# Patient Record
Sex: FEMALE | Race: ASIAN | NOT HISPANIC OR LATINO | Employment: FULL TIME | URBAN - METROPOLITAN AREA
[De-identification: names, ages, dates, MRNs, and addresses within clinical notes are randomized per-mention and may not be internally consistent; named-entity substitution may affect disease eponyms.]

---

## 2021-06-02 ENCOUNTER — RECORDS - HEALTHEAST (OUTPATIENT)
Dept: ADMINISTRATIVE | Facility: CLINIC | Age: 41
End: 2021-06-02

## 2021-06-19 ENCOUNTER — HOSPITAL ENCOUNTER (EMERGENCY)
Dept: EMERGENCY MEDICINE | Facility: HOSPITAL | Age: 41
Discharge: HOME | End: 2021-06-19
Attending: EMERGENCY MEDICINE

## 2021-06-19 DIAGNOSIS — O20.0 THREATENED MISCARRIAGE: ICD-10-CM

## 2021-06-19 DIAGNOSIS — R10.2 PELVIC PAIN IN FEMALE: ICD-10-CM

## 2021-06-19 LAB
ABO/RH(D): NORMAL
ALBUMIN SERPL-MCNC: 4.2 G/DL (ref 3.5–5)
ALBUMIN UR-MCNC: ABNORMAL G/DL
ALP SERPL-CCNC: 71 U/L (ref 45–120)
ALT SERPL W P-5'-P-CCNC: 28 U/L (ref 0–45)
ANION GAP SERPL CALCULATED.3IONS-SCNC: 12 MMOL/L (ref 5–18)
ANTIBODY SCREEN: NEGATIVE
APPEARANCE UR: CLEAR
APTT PPP: 25 SECONDS (ref 24–37)
AST SERPL W P-5'-P-CCNC: 23 U/L (ref 0–40)
BACTERIA #/AREA URNS HPF: ABNORMAL /[HPF]
BASOPHILS # BLD AUTO: 0 THOU/UL (ref 0–0.2)
BASOPHILS NFR BLD AUTO: 0 % (ref 0–2)
BILIRUB DIRECT SERPL-MCNC: 0.3 MG/DL
BILIRUB SERPL-MCNC: 1 MG/DL (ref 0–1)
BILIRUB UR QL STRIP: NEGATIVE
BUN SERPL-MCNC: 4 MG/DL (ref 8–22)
CALCIUM SERPL-MCNC: 8.9 MG/DL (ref 8.5–10.5)
CHLORIDE BLD-SCNC: 105 MMOL/L (ref 98–107)
CO2 SERPL-SCNC: 17 MMOL/L (ref 22–31)
COLOR UR AUTO: ABNORMAL
CREAT SERPL-MCNC: 0.65 MG/DL (ref 0.6–1.1)
EOSINOPHIL # BLD AUTO: 0 THOU/UL (ref 0–0.4)
EOSINOPHIL NFR BLD AUTO: 0 % (ref 0–6)
ERYTHROCYTE [DISTWIDTH] IN BLOOD BY AUTOMATED COUNT: 13.2 % (ref 11–14.5)
GFR SERPL CREATININE-BSD FRML MDRD: >60 ML/MIN/1.73M2
GLUCOSE BLD-MCNC: 133 MG/DL (ref 70–125)
GLUCOSE UR STRIP-MCNC: NEGATIVE MG/DL
HCG SERPL QL: POSITIVE
HCG SERPL-ACNC: 101 MLU/ML (ref 0–4)
HCT VFR BLD AUTO: 41.1 % (ref 35–47)
HGB BLD-MCNC: 13.8 G/DL (ref 12–16)
HGB UR QL STRIP: ABNORMAL
IMM GRANULOCYTES # BLD: 0.1 THOU/UL
IMM GRANULOCYTES NFR BLD: 1 %
INR PPP: 1.02 (ref 0.9–1.1)
KETONES UR STRIP-MCNC: ABNORMAL MG/DL
LACTATE SERPL-SCNC: 2.6 MMOL/L (ref 0.7–2)
LEUKOCYTE ESTERASE UR QL STRIP: NEGATIVE
LIPASE SERPL-CCNC: 10 U/L (ref 0–52)
LYMPHOCYTES # BLD AUTO: 1.1 THOU/UL (ref 0.8–4.4)
LYMPHOCYTES NFR BLD AUTO: 10 % (ref 20–40)
MAGNESIUM SERPL-MCNC: 1.6 MG/DL (ref 1.8–2.6)
MCH RBC QN AUTO: 29.2 PG (ref 27–34)
MCHC RBC AUTO-ENTMCNC: 33.6 G/DL (ref 32–36)
MCV RBC AUTO: 87 FL (ref 80–100)
MONOCYTES # BLD AUTO: 0.5 THOU/UL (ref 0–0.9)
MONOCYTES NFR BLD AUTO: 4 % (ref 2–10)
MUCOUS THREADS #/AREA URNS LPF: PRESENT LPF
NEUTROPHILS # BLD AUTO: 9.2 THOU/UL (ref 2–7.7)
NEUTROPHILS NFR BLD AUTO: 85 % (ref 50–70)
NITRATE UR QL: NEGATIVE
PH UR STRIP: 8.5 [PH] (ref 5–8)
PLATELET # BLD AUTO: 298 THOU/UL (ref 140–440)
PMV BLD AUTO: 8.9 FL (ref 8.5–12.5)
POC PREG URINE (HCG) HE - HISTORICAL: NEGATIVE
POCT KIT EXPIRATION DATE HE - HISTORICAL: NORMAL
POCT KIT LOT NUMBER HE - HISTORICAL: NORMAL
POCT NEGATIVE CONTROL HE - HISTORICAL: NORMAL
POCT POSITIVE CONTROL HE - HISTORICAL: NORMAL
POTASSIUM BLD-SCNC: 3.5 MMOL/L (ref 3.5–5)
PROT SERPL-MCNC: 7.8 G/DL (ref 6–8)
RBC # BLD AUTO: 4.73 MILL/UL (ref 3.8–5.4)
RBC URINE: 12 HPF
SODIUM SERPL-SCNC: 134 MMOL/L (ref 136–145)
SP GR UR STRIP: 1.02 (ref 1–1.03)
SQUAMOUS EPITHELIAL: <1 /HPF
UROBILINOGEN UR STRIP-ACNC: ABNORMAL
WBC URINE: 1 HPF
WBC: 10.9 THOU/UL (ref 4–11)

## 2021-06-19 ASSESSMENT — MIFFLIN-ST. JEOR: SCORE: 1324.53

## 2021-06-21 ENCOUNTER — COMMUNICATION - HEALTHEAST (OUTPATIENT)
Dept: SCHEDULING | Facility: CLINIC | Age: 41
End: 2021-06-21

## 2021-06-25 NOTE — ED TRIAGE NOTES
Lower abd pain since this morning. hyperventillating in triage. No nausea, diarrhea or constipation. Pt normally gets period every month but hasn't had it for 3 months. Took negative pregnancy test a while ago. States some spotting today. Pt c/o cramping lower abd pain

## 2021-06-26 NOTE — TELEPHONE ENCOUNTER
New Appointment Needed  What is the reason for the visit:    Inpatient/ED Follow Up Appt Request  At what hospital or facility were you seen?: [facility]   What is the reason you were seen?: possible miscarriage   What date were you admitted?: 5/19/2021  What date were you discharged?: date: n/a  What was the recommended timeframe for your follow up appointment?: asap   Provider Preference: prefers female provider   How soon do you need to be seen?: asap  Waitlist offered?: No  Okay to leave a detailed message:  Yes

## 2021-06-26 NOTE — ED PROVIDER NOTES
EMERGENCY DEPARTMENT ENCOUNTER      NAME: Donita Osullivan  AGE: 41 y.o. female  YOB: 1980  MRN: [id number]  EVALUATION DATE & TIME: 6/19/2021  3:06 PM    PCP: Provider, No Primary Care    ED PROVIDER: Elizabeth Matthew M.D.      Chief Complaint   Patient presents with     Abdominal Pain         FINAL IMPRESSION:  1. Threatened miscarriage    2. Pelvic pain in female          ED COURSE & MEDICAL DECISION MAKING:    Pertinent Labs & Imaging studies reviewed. (See chart for details)  3:09 PM I met with the patient to gather history and to perform my initial exam. We discussed plans for the ED course, including diagnostic testing and treatment. PPE worn: n95 mask.  3:16 PM Bedside ultrasound completed.  ED Course as of Jun 19 1914   Sat Jun 19, 2021   1522 Patient is a 41-year-old female comes in today with lower abdominal pain.  She is reports that started gradually but then got a lot worse throughout the day and she looks quite uncomfortable when I was in there.  She has tenderness to the lower abdomen.  She does not believe she is pregnant.  She had she had negative pregnancy test recently.  I did a point-of-care ultrasound and look for any signs of free fluid and did not see any.  Suprapubic area was little difficult since she did not have a full bladder so orientation was difficult.  I did not see any free fluid.  It is possible there is something in the uterus but was hard to tell for sure.  I ordered some blood work and will plan to get CT imaging on her.  Order some morphine and some IV fluids.  On arrival she was tachypneic but heart rate and blood pressure both looked okay.  She will need to be hooked up to the monitor.  I discussed all this with her and she is in agreement with the plan.    [CB]   9375 Patient's urine pregnancy came back negative and then her blood pregnancy came back positive.  I called down to CT because I was in a hold off but CT had already been obtained.  It looks like the blood  pregnancy test came back 4 minutes after the CT had already been started.  The urine pregnancy test came back 40 minutes ago and came back negative.  We will see what CT imaging looks like to help determine what is going on with her.  She had quite a lot of discomfort so hopefully we can figure out what is going on.  I have ordered an ultrasound of the baby to further evaluate.    [CB]   1700 Beta quant came back at 101.  The CT scan did not show another cause of her pain.  We will try to get her down for ultrasound imaging but at this point I am suspecting that she may be miscarrying.    [CB]   1844 It is very possible the patient is actively miscarrying.  She'll need to follow-up closely.  I'll check in on her and see how she is feeling.    [CB]   1849 I went back and talked to the patient.  I told her what we knew and we didn't know.  I told her it was really important to have her follow-up as an outpatient and she agreed to do so.  She agrees to do so.  She has a family practice doctor that she follows with as her primary and I think they could help manage this.  I told her to take Tylenol as needed for pain and she agrees to do that.  We will get her discharged home.    [CB]      ED Course User Index  [CB] Elizabeth Matthew MD       At the conclusion of the encounter I discussed  the results of all of the tests and the disposition with patient.   All questions were answered.  The patient acknowledged understanding and was involved in the decision making regarding the overall care plan.      I discussed with patient the utility, limitations and findings of the exam/interventions/studies done during this visit as well as the list of differential diagnosis and symptoms to monitor/return to ER for.  Additional verbal discharge instructions were provided.     MEDICATIONS GIVEN IN THE EMERGENCY:  Medications   sodium chloride flush 10 mL (NS) (10 mL Intravenous Given 6/19/21 1711)   sodium chloride 0.9% 1,000  mL (0 mL Intravenous Stopped 6/19/21 1700)   morphine injection 4 mg (4 mg Intravenous Given 6/19/21 1535)   iopamidol solution 100 mL (ISOVUE-370) (100 mL Intravenous Given 6/19/21 1636)   acetaminophen tablet 1,000 mg (TYLENOL) (1,000 mg Oral Given 6/19/21 1709)       NEW PRESCRIPTIONS STARTED AT TODAY'S ER VISIT  There are no discharge medications for this patient.         =================================================================    HPI    Patient information was obtained from: patient     Use of : Yes (In Person, family) - Language: Albertina Osullivan is a 41 y.o. female who presents for evaluation of lower abdominal pain.     Patient reports gradual onset cramping lower abdominal pain which began this morning and acutely worsened this afternoon. Her pain is constant and radiates into her back. Also reports recent small amounts of vaginal bleeding but had increased bleeding this morning. Denies nausea or vomiting. No history of STI's. Patient is otherwise healthy. No other complaints or concerns expressed at this time.      REVIEW OF SYSTEMS   Except as stated in the HPI all other systems reviewed and are negative.    PAST MEDICAL HISTORY:  No past medical history on file.    PAST SURGICAL HISTORY:  No past surgical history on file.    CURRENT MEDICATIONS:      Current Facility-Administered Medications:      [COMPLETED] Insert peripheral IV, , , Once **AND** [COMPLETED] Saline lock IV, , , Once **AND** sodium chloride flush 10 mL (NS), 10 mL, Intravenous, [facility], Elizabeth Matthew MD, 10 mL at 06/19/21 1711  No current outpatient medications on file.    ALLERGIES:  No Known Allergies    FAMILY HISTORY:  No family history on file.    SOCIAL HISTORY:   Social History     Socioeconomic History     Marital status: Single     Spouse name: Not on file     Number of children: Not on file     Years of education: Not on file     Highest education level: Not on file   Occupational History  "    Not on file   Social Needs     Financial resource strain: Not on file     Food insecurity     Worry: Not on file     Inability: Not on file     Transportation needs     Medical: Not on file     Non-medical: Not on file   Tobacco Use     Smoking status: Not on file   Substance and Sexual Activity     Alcohol use: Not on file     Drug use: Not on file     Sexual activity: Not on file   Lifestyle     Physical activity     Days per week: Not on file     Minutes per session: Not on file     Stress: Not on file   Relationships     Social connections     Talks on phone: Not on file     Gets together: Not on file     Attends Zoroastrian service: Not on file     Active member of club or organization: Not on file     Attends meetings of clubs or organizations: Not on file     Relationship status: Not on file     Intimate partner violence     Fear of current or ex partner: Not on file     Emotionally abused: Not on file     Physically abused: Not on file     Forced sexual activity: Not on file   Other Topics Concern     Not on file   Social History Narrative     Not on file       PHYSICAL EXAM    VITAL SIGNS: BP 95/58   Pulse 69   Temp 98  F (36.7  C) (Oral)   Resp (!) 32   Ht 5' 3\" (1.6 m)   Wt 150 lb (68 kg)   LMP 03/19/2021   SpO2 97%   BMI 26.57 kg/m     GENERAL: Awake, Alert, answering questions, Very uncomfortable, Well nourished  HEENT: Normal cephalic, Atraumatic, bilateral external ears normal, No scleral icterus, mask in place  NECK: No obvious swelling or abnormality, No stridor  PULMONARY:Normal and symmetric breath sounds, No respiratory distress, Lungs clear to auscultation bilaterally. No wheezing  CARDIOVASCULAR: Regular rate and rhythm, Distal pulses present and normal.  ABDOMINAL: Soft, Nondistended, lower abdominal tenderness, No flank tenderness, No palpable masses  BACK: No bruising or tenderness.  EXTREMITIES: Moves all extremities spontaneously, warm, no edema, No major deformities  NEURO: No " facial droop, normal motor function, Normal speech   PSYCH: Normal mood and affect  SKIN: No rashes on visualized skin, dry, warm     LAB:  All pertinent labs reviewed and interpreted.  Results for orders placed or performed during the hospital encounter of 06/19/21   Type and Screen   Result Value Ref Range    ABORh O POS     Antibody Screen Negative Negative   INR   Result Value Ref Range    INR 1.02 0.90 - 1.10   APTT(PTT)   Result Value Ref Range    PTT 25 24 - 37 seconds   Magnesium   Result Value Ref Range    Magnesium 1.6 (L) 1.8 - 2.6 mg/dL   Lipase   Result Value Ref Range    Lipase 10 0 - 52 U/L   Hepatic Profile   Result Value Ref Range    Bilirubin, Total 1.0 0.0 - 1.0 mg/dL    Bilirubin, Direct 0.3 <=0.5 mg/dL    Protein, Total 7.8 6.0 - 8.0 g/dL    Albumin 4.2 3.5 - 5.0 g/dL    Alkaline Phosphatase 71 45 - 120 U/L    AST 23 0 - 40 U/L    ALT 28 0 - 45 U/L   Lactic Acid   Result Value Ref Range    Lactic Acid 2.6 (H) 0.7 - 2.0 mmol/L   Basic Metabolic Panel   Result Value Ref Range    Sodium 134 (L) 136 - 145 mmol/L    Potassium 3.5 3.5 - 5.0 mmol/L    Chloride 105 98 - 107 mmol/L    CO2 17 (L) 22 - 31 mmol/L    Anion Gap, Calculation 12 5 - 18 mmol/L    Glucose 133 (H) 70 - 125 mg/dL    Calcium 8.9 8.5 - 10.5 mg/dL    BUN 4 (L) 8 - 22 mg/dL    Creatinine 0.65 0.60 - 1.10 mg/dL    GFR MDRD Af Amer >60 >60 mL/min/1.73m2    GFR MDRD Non Af Amer >60 >60 mL/min/1.73m2   Urinalysis-UC if Indicated   Result Value Ref Range    Color, UA Light Yellow Light Yellow, Yellow    Clarity, UA Clear Clear    Glucose, UA Negative Negative    Protein, UA 30 mg/dL (!) Negative    Bilirubin, UA Negative Negative    Urobilinogen, UA <2.0 mg/dL <2.0 mg/dL    pH, UA 8.5 (H) 5.0 - 8.0    Blood, UA 0.1 mg/dL (!) Negative    Ketones, UA 60 mg/dL (!) Negative    Nitrite, UA Negative Negative    Leukocytes, UA Negative Negative    Specific Gravity, UA 1.016 1.001 - 1.030    RBC, UA 12 (H) <=2 hpf    WBC UA 1 <=5 hpf    Bacteria,  UA None Seen None Seen    Squamous Epithel, UA <1 <=5 /HPF    Mucus, UA Present (!) None Seen lpf   HCG, Qual   Result Value Ref Range    Beta hCG Qualitative Positive (!) Negative   HM1 (CBC with Diff)   Result Value Ref Range    WBC 10.9 4.0 - 11.0 thou/uL    RBC 4.73 3.80 - 5.40 mill/uL    Hemoglobin 13.8 12.0 - 16.0 g/dL    Hematocrit 41.1 35.0 - 47.0 %    MCV 87 80 - 100 fL    MCH 29.2 27.0 - 34.0 pg    MCHC 33.6 32.0 - 36.0 g/dL    RDW 13.2 11.0 - 14.5 %    Platelets 298 140 - 440 thou/uL    MPV 8.9 8.5 - 12.5 fL    Neutrophils % 85 (H) 50 - 70 %    Lymphocytes % 10 (L) 20 - 40 %    Monocytes % 4 2 - 10 %    Eosinophils % 0 0 - 6 %    Basophils % 0 0 - 2 %    Immature Granulocyte % 1 (H) <=0 %    Neutrophils Absolute 9.2 (H) 2.0 - 7.7 thou/uL    Lymphocytes Absolute 1.1 0.8 - 4.4 thou/uL    Monocytes Absolute 0.5 0.0 - 0.9 thou/uL    Eosinophils Absolute 0.0 0.0 - 0.4 thou/uL    Basophils Absolute 0.0 0.0 - 0.2 thou/uL    Immature Granulocyte Absolute 0.1 (H) <=0.0 thou/uL   HCG, Quant   Result Value Ref Range    Beta-hCG, Quantitative 101 (H) 0 - 4 mlU/mL   POCT pregnancy, urine   Result Value Ref Range    POC Preg, Urine Negative Negative    POCT Kit Lot Number [id number]     POCT Kit Expiration Date 2022-07-31     Pos Control Valid Control Valid Control    Neg Control Valid Control Valid Control       RADIOLOGY:      Us Ob < 14 Weeks With Transvaginal    Result Date: 6/19/2021  EXAM: US OB < 14 WEEKS WITH TRANSVAGINAL LOCATION: [facility] DATE/TIME: 6/19/2021 5:42 PM INDICATION: Pelvic pain. Beta hCG level of 101 COMPARISON: CT abdomen and pelvis 06/19/2021. TECHNIQUE: Transabdominal scans were performed. Endovaginal ultrasound was performed to better visualize the embryo. FINDINGS: The uterus measures 11.1 x 5.3 x 5.9 cm. Endometrial complex measures 2.9 cm in thickness and contains a large amount of fluid. Possible tiny yolk sac within the lower endometrial canal.  RIGHT OVARY: Normal.  LEFT OVARY: Not definitely visualized due to bowel gas.     Large amount of fluid within the endometrial canal. Possible yolk sac in the lower endometrial canal, but no fetal pole identified. Findings may represent a  in progress. Follow up with beta hCG levels and ultrasound recommended.    Ct Abdomen Pelvis Without Oral With Iv Contrast    Result Date: 2021  EXAM: CT ABDOMEN PELVIS WO ORAL W IV CONTRAST LOCATION: [facility] DATE/TIME: 2021 4:29 PM INDICATION: abdominal pain COMPARISON: None. TECHNIQUE: CT scan of the abdomen and pelvis was performed following injection of IV contrast. Multiplanar reformats were obtained. Dose reduction techniques were used. CONTRAST: Iopamidol (Isovue-370) 100mL FINDINGS: LOWER CHEST: Normal. HEPATOBILIARY: Mild hepatic steatosis. No suspicious liver lesion. Gallbladder unremarkable. PANCREAS: Normal. SPLEEN: Normal. ADRENAL GLANDS: Normal. KIDNEYS/BLADDER: Normal. BOWEL: Normal appendix. No free air, free fluid, or inflammatory change. Large amount of stool throughout the colon. LYMPH NODES: Normal. VASCULATURE: Unremarkable. PELVIC ORGANS: Prominent fluid-filled uterus. No adnexal mass. MUSCULOSKELETAL: Normal.     1.  No acute abnormality in the abdomen or pelvis. 2.  Mild hepatic steatosis. 3.  Prominent fluid-filled uterus correlate with menstrual history. Follow-up pelvic ultrasound may be indicated depending on the clinical picture.    PROCEDURES:   PROCEDURE: Emergency Department Limited Bedside Screening Ultrasound   ANATOMICAL WINDOW: Lower abdomen   INDICATIONS: Abdominal pain and vaginal bleeding   PROCEDURE PROVIDER: Dr. Matthew   FINDINGS: Pelvic Ultrasound: indications: abdominal pain and pelvic pain. Type of US performed: transabdominal exam. ED Findings:  No free fluid   IMAGES PRINTED & SCANNED OR SAVED TO MEMORY: yes         Jadon MACKENZIE, am serving as a scribe to document services personally performed by Dr. Matthew  based on my observation and the provider's statements to me. I, Elizabeth Matthew MD attest that Jadon Fleming is acting in a scribe capacity, has observed my performance of the services and has documented them in accordance with my direction.    Elizabeth Matthew M.D.  Emergency Medicine  [facility] EMERGENCY DEPARTMENT  [address]  [facility] [address]  Dept: [phone]  Loc: [phone]     Elizabeth Matthew MD  06/19/21 1915

## 2021-07-06 VITALS — WEIGHT: 150 LBS | HEIGHT: 63 IN | BODY MASS INDEX: 26.58 KG/M2

## 2021-08-27 ENCOUNTER — OFFICE VISIT (OUTPATIENT)
Dept: FAMILY MEDICINE | Facility: CLINIC | Age: 41
End: 2021-08-27
Payer: COMMERCIAL

## 2021-08-27 VITALS
HEIGHT: 64 IN | DIASTOLIC BLOOD PRESSURE: 72 MMHG | BODY MASS INDEX: 27.31 KG/M2 | HEART RATE: 70 BPM | SYSTOLIC BLOOD PRESSURE: 114 MMHG | TEMPERATURE: 98.5 F | WEIGHT: 160 LBS | OXYGEN SATURATION: 99 %

## 2021-08-27 DIAGNOSIS — Z00.00 ROUTINE GENERAL MEDICAL EXAMINATION AT A HEALTH CARE FACILITY: Primary | ICD-10-CM

## 2021-08-27 DIAGNOSIS — Z11.3 SCREEN FOR STD (SEXUALLY TRANSMITTED DISEASE): ICD-10-CM

## 2021-08-27 DIAGNOSIS — Z12.4 PAP SMEAR FOR CERVICAL CANCER SCREENING: ICD-10-CM

## 2021-08-27 PROCEDURE — G0123 SCREEN CERV/VAG THIN LAYER: HCPCS | Performed by: FAMILY MEDICINE

## 2021-08-27 PROCEDURE — 87624 HPV HI-RISK TYP POOLED RSLT: CPT | Performed by: FAMILY MEDICINE

## 2021-08-27 PROCEDURE — 87591 N.GONORRHOEAE DNA AMP PROB: CPT | Performed by: FAMILY MEDICINE

## 2021-08-27 PROCEDURE — 99395 PREV VISIT EST AGE 18-39: CPT | Performed by: FAMILY MEDICINE

## 2021-08-27 PROCEDURE — 87491 CHLMYD TRACH DNA AMP PROBE: CPT | Performed by: FAMILY MEDICINE

## 2021-08-27 ASSESSMENT — MIFFLIN-ST. JEOR: SCORE: 1377.82

## 2021-08-27 NOTE — PROGRESS NOTES
SUBJECTIVE:   CC: Donita Osullivan is an 41 year old woman who presents for preventive health visit.     Patient has been advised of split billing requirements and indicates understanding: Yes     Healthy Habits:     Getting at least 3 servings of Calcium per day:  Yes    Bi-annual eye exam:  Yes    Dental care twice a year:  NO    Sleep apnea or symptoms of sleep apnea:  None    Diet:  Regular (no restrictions)    Frequency of exercise:  None    Taking medications regularly:  Yes    Medication side effects:  None    PHQ-2 Total Score: 0    Ability to successfully perform activities of daily living: Yes, no assistance needed      Today's PHQ-2 Score:   PHQ-2 ( 1999 Pfizer) 8/27/2021   Q1: Little interest or pleasure in doing things 0   Q2: Feeling down, depressed or hopeless 0   PHQ-2 Score 0   Q1: Little interest or pleasure in doing things Not at all   Q2: Feeling down, depressed or hopeless Not at all   PHQ-2 Score 0       Abuse: Current or Past (Physical, Sexual or Emotional) - No  Do you feel safe in your environment? Yes      Social History     Tobacco Use     Smoking status: Never Smoker     Smokeless tobacco: Never Used   Substance Use Topics     Alcohol use: Never         Alcohol Use 8/27/2021   Prescreen: >3 drinks/day or >7 drinks/week? Not Applicable       Reviewed orders with patient.  Reviewed health maintenance and updated orders accordingly - Yes  Labs reviewed in EPIC    Breast Cancer Screening:  Any new diagnosis of family breast, ovarian, or bowel cancer? No    Patient under 40 years of age: Routine Mammogram Screening not recommended.   Pertinent mammograms are reviewed under the imaging tab.    History of abnormal Pap smear: NO - age 30-65 PAP every 5 years with negative HPV co-testing recommended     Reviewed and updated as needed this visit by clinical staff  Tobacco  Allergies  Meds              Reviewed and updated as needed this visit by Provider  Tobacco  Allergies  Meds  Problems  Med  "Hx  Surg Hx  Fam Hx                OBJECTIVE:   /72 (BP Location: Right arm, Patient Position: Sitting, Cuff Size: Adult Regular)   Pulse 70   Temp 98.5  F (36.9  C) (Oral)   Ht 1.613 m (5' 3.5\")   Wt 72.6 kg (160 lb)   LMP 08/06/2021 (Approximate)   SpO2 99%   BMI 27.90 kg/m    Physical Exam  General: Alert and oriented, in NAD.  Eyes: PERRL, EOMI, sclera normal.  HENT: Normocephalic, no pharyngeal erythema, MMM.  Neck: Supple, no adenopathy.  Heart: Normal S1 and S2, regular rhythm. No murmurs, gallops, rubs.  Lungs: CTA bilaterally, no wheezes, no crackles, no rhonchi.  Abdomen: Soft, non-tender, non-distended, BS+.   MSK: Normal ROM of upper and lower extremities.  Extremities: No peripheral edema.  Psych: Normal mood and affect.      ASSESSMENT/PLAN:   Donita was seen today for physical.    Diagnoses and all orders for this visit:    Routine general medical examination at a health care facility    Pap smear for cervical cancer screening: Pap with cotesting. If normal, repeat in 5 years.  -     Pap screen with HPV - recommended age 30 - 65 years    Screen for STD (sexually transmitted disease): Asymptomatic screening.  -     Neisseria gonorrhoeae PCR - Clinic Collect  -     Chlamydia trachomatis PCR - Clinic Collect    Preconception counseling: Hx of recent SAB. She does desire pregnancy- reviewed importance of taking prenatal vitamins and healthy eating/exercise. Due to her age, if she is not yet pregnant in 3 months, will refer to OB-GYN. Reviewed timing intercourse      Patient has been advised of split billing requirements and indicates understanding: Yes  COUNSELING:  Reviewed preventive health counseling, as reflected in patient instructions       Regular exercise       Healthy diet/nutrition    Estimated body mass index is 27.9 kg/m  as calculated from the following:    Height as of this encounter: 1.613 m (5' 3.5\").    Weight as of this encounter: 72.6 kg (160 lb).    Weight management " plan: Discussed healthy diet and exercise guidelines    She reports that she has never smoked. She has never used smokeless tobacco.      Counseling Resources:  ATP IV Guidelines  Pooled Cohorts Equation Calculator  Breast Cancer Risk Calculator  BRCA-Related Cancer Risk Assessment: FHS-7 Tool  FRAX Risk Assessment  ICSI Preventive Guidelines  Dietary Guidelines for Americans, 2010  USDA's MyPlate  ASA Prophylaxis  Lung CA Screening    Muna Moralez MD  [facility]

## 2021-08-29 LAB
C TRACH DNA SPEC QL NAA+PROBE: NEGATIVE
N GONORRHOEA DNA SPEC QL NAA+PROBE: NEGATIVE

## 2021-08-31 LAB
HUMAN PAPILLOMA VIRUS 16 DNA: NEGATIVE
HUMAN PAPILLOMA VIRUS 18 DNA: NEGATIVE
HUMAN PAPILLOMA VIRUS FINAL DIAGNOSIS: NORMAL
HUMAN PAPILLOMA VIRUS OTHER HR: NEGATIVE

## 2021-09-03 LAB
BKR LAB AP GYN ADEQUACY: NORMAL
BKR LAB AP GYN INTERPRETATION: NORMAL
BKR LAB AP HPV REFLEX: NORMAL
BKR LAB AP PREVIOUS ABNORMAL: NORMAL
PATH REPORT.COMMENTS IMP SPEC: NORMAL
PATH REPORT.RELEVANT HX SPEC: NORMAL

## 2021-12-03 ENCOUNTER — OFFICE VISIT (OUTPATIENT)
Dept: FAMILY MEDICINE | Facility: CLINIC | Age: 41
End: 2021-12-03
Payer: COMMERCIAL

## 2021-12-03 VITALS
OXYGEN SATURATION: 99 % | SYSTOLIC BLOOD PRESSURE: 114 MMHG | WEIGHT: 160 LBS | HEART RATE: 98 BPM | DIASTOLIC BLOOD PRESSURE: 66 MMHG | BODY MASS INDEX: 27.31 KG/M2 | TEMPERATURE: 97.9 F | RESPIRATION RATE: 16 BRPM | HEIGHT: 64 IN

## 2021-12-03 DIAGNOSIS — Z23 IMMUNIZATION DUE: ICD-10-CM

## 2021-12-03 DIAGNOSIS — N97.9 FEMALE INFERTILITY: Primary | ICD-10-CM

## 2021-12-03 DIAGNOSIS — Z23 HIGH PRIORITY FOR 2019-NCOV VACCINE: ICD-10-CM

## 2021-12-03 LAB
ALBUMIN SERPL-MCNC: 3.8 G/DL (ref 3.5–5)
ALP SERPL-CCNC: 68 U/L (ref 45–120)
ALT SERPL W P-5'-P-CCNC: 29 U/L (ref 0–45)
ANION GAP SERPL CALCULATED.3IONS-SCNC: 10 MMOL/L (ref 5–18)
AST SERPL W P-5'-P-CCNC: 17 U/L (ref 0–40)
BILIRUB SERPL-MCNC: 0.7 MG/DL (ref 0–1)
BUN SERPL-MCNC: 7 MG/DL (ref 8–22)
CALCIUM SERPL-MCNC: 9.4 MG/DL (ref 8.5–10.5)
CHLORIDE BLD-SCNC: 106 MMOL/L (ref 98–107)
CO2 SERPL-SCNC: 23 MMOL/L (ref 22–31)
CREAT SERPL-MCNC: 0.61 MG/DL (ref 0.6–1.1)
ERYTHROCYTE [DISTWIDTH] IN BLOOD BY AUTOMATED COUNT: 12.9 % (ref 10–15)
FSH SERPL-ACNC: 5.7 MIU/ML
GFR SERPL CREATININE-BSD FRML MDRD: >90 ML/MIN/1.73M2
GLUCOSE BLD-MCNC: 107 MG/DL (ref 70–125)
HBA1C MFR BLD: 6.2 % (ref 0–5.6)
HCT VFR BLD AUTO: 37.3 % (ref 35–47)
HGB BLD-MCNC: 12.1 G/DL (ref 11.7–15.7)
MCH RBC QN AUTO: 28.6 PG (ref 26.5–33)
MCHC RBC AUTO-ENTMCNC: 32.4 G/DL (ref 31.5–36.5)
MCV RBC AUTO: 88 FL (ref 78–100)
PLATELET # BLD AUTO: 306 10E3/UL (ref 150–450)
POTASSIUM BLD-SCNC: 3.7 MMOL/L (ref 3.5–5)
PROLACTIN SERPL-MCNC: 12.6 NG/ML (ref 0–20)
PROT SERPL-MCNC: 7.6 G/DL (ref 6–8)
RBC # BLD AUTO: 4.23 10E6/UL (ref 3.8–5.2)
SODIUM SERPL-SCNC: 139 MMOL/L (ref 136–145)
TSH SERPL DL<=0.005 MIU/L-ACNC: 0.55 UIU/ML (ref 0.3–5)
WBC # BLD AUTO: 6.1 10E3/UL (ref 4–11)

## 2021-12-03 PROCEDURE — 83001 ASSAY OF GONADOTROPIN (FSH): CPT | Performed by: FAMILY MEDICINE

## 2021-12-03 PROCEDURE — 90686 IIV4 VACC NO PRSV 0.5 ML IM: CPT | Performed by: FAMILY MEDICINE

## 2021-12-03 PROCEDURE — 83036 HEMOGLOBIN GLYCOSYLATED A1C: CPT | Performed by: FAMILY MEDICINE

## 2021-12-03 PROCEDURE — 99214 OFFICE O/P EST MOD 30 MIN: CPT | Mod: 25 | Performed by: FAMILY MEDICINE

## 2021-12-03 PROCEDURE — 80053 COMPREHEN METABOLIC PANEL: CPT | Performed by: FAMILY MEDICINE

## 2021-12-03 PROCEDURE — 36415 COLL VENOUS BLD VENIPUNCTURE: CPT | Performed by: FAMILY MEDICINE

## 2021-12-03 PROCEDURE — 84443 ASSAY THYROID STIM HORMONE: CPT | Performed by: FAMILY MEDICINE

## 2021-12-03 PROCEDURE — 84146 ASSAY OF PROLACTIN: CPT | Performed by: FAMILY MEDICINE

## 2021-12-03 PROCEDURE — 90471 IMMUNIZATION ADMIN: CPT | Performed by: FAMILY MEDICINE

## 2021-12-03 PROCEDURE — 85027 COMPLETE CBC AUTOMATED: CPT | Performed by: FAMILY MEDICINE

## 2021-12-03 ASSESSMENT — MIFFLIN-ST. JEOR: SCORE: 1377.82

## 2021-12-03 NOTE — PROGRESS NOTES
"SUBJECTIVE  Donita Osullivan is a 41 year old female here for:    Chief Complaint   Patient presents with     Fertility     Discuss options      Imm/Inj     COVID-19 VACCINE      last year  SAB earlier this year  She was trying for pregnancy for about 6 months prior to SAB and has been trying after as well  She had not tried for pregnancy prior to getting   Denies any other symptoms  She is up to date on cervical cancer screening. Had recent gonorrhea/chlamydia testing after her SAB  Has been trying to time intercourse  She does want to be pregnant  Having regular periods, although her periods have become lighter (about 3 days) compared to when she was younger    Due for flu shot and Covid-19 booster- she would like to defer Covid-19 booster until she comes in with her mother.     ROS  See HPI    Reviewed Past Medical History, Medications, Family History and Social History in Epic and up to date with no new changes.    OBJECTIVE  /66   Pulse 98   Temp 97.9  F (36.6  C) (Oral)   Resp 16   Ht 1.613 m (5' 3.5\")   Wt 72.6 kg (160 lb)   LMP 11/21/2021 (Within Days)   SpO2 99%   BMI 27.90 kg/m       General: Cooperative, pleasant, in no acute distress    LABS & IMAGES   Results for orders placed or performed in visit on 12/03/21   CBC with platelets     Status: Normal   Result Value Ref Range    WBC Count 6.1 4.0 - 11.0 10e3/uL    RBC Count 4.23 3.80 - 5.20 10e6/uL    Hemoglobin 12.1 11.7 - 15.7 g/dL    Hematocrit 37.3 35.0 - 47.0 %    MCV 88 78 - 100 fL    MCH 28.6 26.5 - 33.0 pg    MCHC 32.4 31.5 - 36.5 g/dL    RDW 12.9 10.0 - 15.0 %    Platelet Count 306 150 - 450 10e3/uL   Hemoglobin A1c     Status: Abnormal   Result Value Ref Range    Hemoglobin A1C 6.2 (H) 0.0 - 5.6 %         ASSESSMENT/PLAN:   Donita was seen today for fertility and imm/inj.    Diagnoses and all orders for this visit:    Female infertility: About 1 year total of trying for pregnancy- started when she was . Due to advanced " maternal age, will start workup and if negative, consider pelvic US and referring to OB-GYN for further evaluation/management. I did advise her  to have semen analysis. Of note- she did have 1 pregnancy earlier this year- SAB. Reviewed timing of intercourse and taking prenatal vitamin daily.  -     CBC with platelets; Future  -     Comprehensive metabolic panel (BMP + Alb, Alk Phos, ALT, AST, Total. Bili, TP); Future  -     TSH with free T4 reflex; Future  -     Prolactin; Future  -     Hemoglobin A1c; Future  -     Follicle stimulating hormone; Future  -     CBC with platelets  -     Comprehensive metabolic panel (BMP + Alb, Alk Phos, ALT, AST, Total. Bili, TP)  -     TSH with free T4 reflex  -     Prolactin  -     Hemoglobin A1c  -     Follicle stimulating hormone    High priority for 2019-nCoV vaccine- declined- deferred until she comes in with her mother.  -     COVID-19,PF,MODERNA (18+ Yrs BOOSTER .25mL)    Immunization due  -     INFLUENZA VACCINE IM >6 MO VALENT IIV4 (ALFURIA/FLUZONE)        30 minutes spent on the date of the encounter doing chart review, history and exam, documentation and further activities as noted above        Visit was completed along with Albertina     Options for treatment and follow-up care were reviewed with the patient. Donita Abran and/or guardian was engaged and actively involved in the decision making process. Donita Osullivan and/or guardian verbalized understanding of the options discussed and was satisfied with the final plan.      Muna Moralez MD

## 2021-12-13 ENCOUNTER — TELEPHONE (OUTPATIENT)
Dept: FAMILY MEDICINE | Facility: CLINIC | Age: 41
End: 2021-12-13
Payer: COMMERCIAL

## 2021-12-13 PROBLEM — R73.03 PREDIABETES: Status: ACTIVE | Noted: 2021-12-13

## 2021-12-13 NOTE — TELEPHONE ENCOUNTER
Called and relayed message to patient via phone . Patient will try to increase her exercise and monitor her diet.     Patient is requesting a referral to a OB-GYN specialist when you are able.    Thank you,    Jenifer MOSCOSO RN      ----- Message from Muna Moralez MD sent at 12/13/2021 12:44 PM CST -----  Please call patient and let her know that her blood tests were normal, except she does have pre-diabetes. This could contribute to her fertility. However, it is also very important that she is as healthy as possible as she is trying to get pregnant. Pre-diabetes can be treated with diet and exercise (we can recheck the A1C in 6-12 months)> I would recommend that she exercise at least 30 minutes, 5 days a week. She should also focus on getting a lot of fruits/vegetables, lean meats (chicken, fish) and limit the portions of carbohydrates (rice, bread, pasta). She could always try switching to brown rice and limiting to 1 cup per meal. I would also like to offer patient a referral to OB-GYN specialist- due to her age (advanced maternal age) and her desire to be pregnant. If she does not want the referral, I would just recommend those dietary/exercise changes and continuing to take a prenatal vitamin.

## 2021-12-31 ENCOUNTER — IMMUNIZATION (OUTPATIENT)
Dept: NURSING | Facility: CLINIC | Age: 41
End: 2021-12-31
Payer: COMMERCIAL

## 2021-12-31 PROCEDURE — 0064A COVID-19,PF,MODERNA (18+ YRS BOOSTER .25ML): CPT

## 2021-12-31 PROCEDURE — 91306 COVID-19,PF,MODERNA (18+ YRS BOOSTER .25ML): CPT

## 2023-01-30 ENCOUNTER — TELEPHONE (OUTPATIENT)
Dept: FAMILY MEDICINE | Facility: CLINIC | Age: 43
End: 2023-01-30

## 2023-01-30 ENCOUNTER — OFFICE VISIT (OUTPATIENT)
Dept: FAMILY MEDICINE | Facility: CLINIC | Age: 43
End: 2023-01-30
Payer: COMMERCIAL

## 2023-01-30 VITALS
SYSTOLIC BLOOD PRESSURE: 102 MMHG | TEMPERATURE: 97.7 F | HEIGHT: 64 IN | BODY MASS INDEX: 27.49 KG/M2 | OXYGEN SATURATION: 98 % | DIASTOLIC BLOOD PRESSURE: 70 MMHG | RESPIRATION RATE: 16 BRPM | WEIGHT: 161 LBS | HEART RATE: 91 BPM

## 2023-01-30 DIAGNOSIS — Z29.89 NEED FOR MALARIA PROPHYLAXIS: ICD-10-CM

## 2023-01-30 DIAGNOSIS — Z71.84 TRAVEL ADVICE ENCOUNTER: Primary | ICD-10-CM

## 2023-01-30 DIAGNOSIS — N92.6 LATE MENSES: ICD-10-CM

## 2023-01-30 LAB — HCG UR QL: NEGATIVE

## 2023-01-30 PROCEDURE — 91313 COVID-19 VACCINE BIVALENT BOOSTER 18+ (MODERNA): CPT | Performed by: FAMILY MEDICINE

## 2023-01-30 PROCEDURE — 90472 IMMUNIZATION ADMIN EACH ADD: CPT | Performed by: FAMILY MEDICINE

## 2023-01-30 PROCEDURE — 0134A COVID-19 VACCINE BIVALENT BOOSTER 18+ (MODERNA): CPT | Performed by: FAMILY MEDICINE

## 2023-01-30 PROCEDURE — 90471 IMMUNIZATION ADMIN: CPT | Performed by: FAMILY MEDICINE

## 2023-01-30 PROCEDURE — 90691 TYPHOID VACCINE IM: CPT | Performed by: FAMILY MEDICINE

## 2023-01-30 PROCEDURE — 99213 OFFICE O/P EST LOW 20 MIN: CPT | Mod: 25 | Performed by: FAMILY MEDICINE

## 2023-01-30 PROCEDURE — 81025 URINE PREGNANCY TEST: CPT | Performed by: FAMILY MEDICINE

## 2023-01-30 PROCEDURE — 90686 IIV4 VACC NO PRSV 0.5 ML IM: CPT | Performed by: FAMILY MEDICINE

## 2023-01-30 RX ORDER — AZITHROMYCIN 500 MG/1
500 TABLET, FILM COATED ORAL DAILY
Qty: 3 TABLET | Refills: 0 | Status: SHIPPED | OUTPATIENT
Start: 2023-01-30 | End: 2023-02-02

## 2023-01-30 RX ORDER — ATOVAQUONE AND PROGUANIL HYDROCHLORIDE 250; 100 MG/1; MG/1
1 TABLET, FILM COATED ORAL DAILY
Qty: 35 TABLET | Refills: 0 | Status: SHIPPED | OUTPATIENT
Start: 2023-01-30

## 2023-01-30 NOTE — PROGRESS NOTES
"  Assessment & Plan     Need for malaria prophylaxis  - atovaquone-proguanil (MALARONE) 250-100 MG tablet  Dispense: 35 tablet; Refill: 0    Late menses  Menses only 2 days late, but will check UPT today. Alternate malaria prophylaxis would be advised if pregnant.   - HCG qualitative urine    Travel advice encounter  Reviewed patient's vaccine history and recommended vaccines.  She needs a typhoid vaccine today and is willing to get that.  Also recommend flu and covid bivalent vaccines, she is agreeable. Traveler's diarrhea discussed, azithromycin prescription sent to be used if needed.  Discussed when to seek medical care.  Discussed malaria prophylaxis, prescribed.  Discussed insect precautions to avoid other insect borne infections.  Discussed food and water safety.  [facility] travel handout given for [facility].  - azithromycin (ZITHROMAX) 500 MG tablet  Dispense: 3 tablet; Refill: 0               BMI:   Estimated body mass index is 28.07 kg/m  as calculated from the following:    Height as of this encounter: 1.613 m (5' 3.5\").    Weight as of this encounter: 73 kg (161 lb).           No follow-ups on file.    Glenda Yanez MD  [facility] KEVIN Duckworth is a 42 year old, presenting for the following health issues:  Travel Clinic      HPI   Traveling with mother to [facility] 2/9/23 x 3 weeks.  [place] believe in [place] the border [facility], but exact destination unclear.  Plan to stay with relatives.            Review of Systems   When asked about the possibility of pregnancy, she states.  Might be a couple of days late.  She is interested in UPT today.        Objective    /70   Pulse 91   Temp 97.7  F (36.5  C) (Oral)   Resp 16   Ht 1.613 m (5' 3.5\")   Wt 73 kg (161 lb)   LMP 12/28/2022 (Within Days)   SpO2 98%   BMI 28.07 kg/m    Body mass index is 28.07 kg/m .  Physical Exam   General: Pleasant well-appearing no acute distress              "

## 2023-01-30 NOTE — TELEPHONE ENCOUNTER
Glenda Yanez MD   1/30/2023  2:48 PM CST Back to Top    Please call and let her know urine pregnancy test is negative - not pregnant. If she does not get her period in the next few days, she should check another test. If she is pregnant, we would need to change travel medications.       Called with assistance of an  in attempt to relay provider test result message as indicated above. However, no answer.  Message left on  with clinic number provided to call for test result.       MONA BolivarN, RN  [facility]

## 2023-09-01 ENCOUNTER — OFFICE VISIT (OUTPATIENT)
Dept: FAMILY MEDICINE | Facility: CLINIC | Age: 43
End: 2023-09-01
Payer: COMMERCIAL

## 2023-09-01 VITALS
HEART RATE: 66 BPM | RESPIRATION RATE: 16 BRPM | BODY MASS INDEX: 25.18 KG/M2 | DIASTOLIC BLOOD PRESSURE: 79 MMHG | OXYGEN SATURATION: 100 % | HEIGHT: 64 IN | TEMPERATURE: 97.9 F | SYSTOLIC BLOOD PRESSURE: 119 MMHG | WEIGHT: 147.5 LBS

## 2023-09-01 DIAGNOSIS — N97.9 FEMALE INFERTILITY: Primary | ICD-10-CM

## 2023-09-01 DIAGNOSIS — N91.2 AMENORRHEA: ICD-10-CM

## 2023-09-01 DIAGNOSIS — R73.03 PREDIABETES: ICD-10-CM

## 2023-09-01 LAB
HBA1C MFR BLD: 6.2 % (ref 0–5.6)
HCG UR QL: NEGATIVE

## 2023-09-01 PROCEDURE — 99214 OFFICE O/P EST MOD 30 MIN: CPT | Performed by: FAMILY MEDICINE

## 2023-09-01 PROCEDURE — 83036 HEMOGLOBIN GLYCOSYLATED A1C: CPT | Performed by: FAMILY MEDICINE

## 2023-09-01 PROCEDURE — 36415 COLL VENOUS BLD VENIPUNCTURE: CPT | Performed by: FAMILY MEDICINE

## 2023-09-01 PROCEDURE — 81025 URINE PREGNANCY TEST: CPT | Performed by: FAMILY MEDICINE

## 2023-09-01 RX ORDER — PRENATAL VIT/IRON FUM/FOLIC AC 27MG-0.8MG
1 TABLET ORAL DAILY
Qty: 90 TABLET | Refills: 3 | Status: SHIPPED | OUTPATIENT
Start: 2023-09-01

## 2023-09-01 NOTE — PROGRESS NOTES
"  Donita was seen today for fertility issues.    Diagnoses and all orders for this visit:    Female infertility: Amenorrhea x 2 months so will obtain UPT. She has lab workup in 2021 including CBC, FSH, prolactin, A1C, TSH, CMP that was normal, except showing pre-diabetes. She is up to date on cervical cancer screening and GC/chlam testing was negative. Plan for pelvic US. Encouraged her partner to have semen testing. Due to age, will refer to OB-GYN. Start PNV. Reviewed timing intercourse and OTC ovulation kits in depth with patient.  -     US Pelvic Complete with Transvaginal; Future  -     Ob/Gyn Referral; Future  -     Prenatal Vit-Fe Fumarate-FA (PRENATAL MULTIVITAMIN W/IRON) 27-0.8 MG tablet; Take 1 tablet by mouth daily  -     HCG qualitative urine; Future  -     HCG qualitative urine    Prediabetes: A1C was 6.2 in 2021- recheck. Counseled on healthy eating, exercise.  -     Hemoglobin A1c; Future  -     Hemoglobin A1c    30 minutes spent on the date of the encounter doing chart review, history and exam, documentation and further activities as noted above        Subjective   Donita is a 43 year old, presenting for the following health issues:  Fertility issues (Had miscarriage in 06/2021  and has not imelda able to conceive since then )      9/1/2023     7:58 AM   Additional Questions   Roomed by Daniela GALLO       History of Present Illness       Reason for visit:  Fertility      Miscarriage 6/2021  Has been trying to get pregnant since then  Not taking PNV  Her partner has not yet had semen testing  She had lab workup in 2021 that was unremarkable except showed pre-diabetes      Review of Systems         Objective    Ht 1.613 m (5' 3.5\")   Wt 66.9 kg (147 lb 8 oz)   BMI 25.72 kg/m    Body mass index is 25.72 kg/m .  Physical Exam   Gen: well appearing  Pelvic: up to date on cervical cancer screening              "

## 2023-09-29 ENCOUNTER — TELEPHONE (OUTPATIENT)
Dept: FAMILY MEDICINE | Facility: CLINIC | Age: 43
End: 2023-09-29
Payer: COMMERCIAL

## 2023-09-29 NOTE — TELEPHONE ENCOUNTER
General Call    Contacts         Type Contact Phone/Fax    09/29/2023 10:58 AM CDT Phone (Incoming) [facility] ULTRASOUND [phone]          Reason for Call:  order for US Pelvic Complete with Transvaginal    What are your questions or concerns:  N Ultrasound requesting for clarification on order. Order was put in for US Pelvic Complete with Transvaginal, will Dr. Moralez want Uterus ovary ultrasound or all uterus follicle ultrasound? Please call [facility]tras[facility] back to provide clarification at [phone], ok to speak with anyone when call this number.    Date of last appointment with provider: 09/01/2023    Okay to leave a detailed message?: No at Home number on file [phone] (home)

## 2023-10-10 ENCOUNTER — TRANSFERRED RECORDS (OUTPATIENT)
Dept: HEALTH INFORMATION MANAGEMENT | Facility: CLINIC | Age: 43
End: 2023-10-10

## 2023-10-10 ENCOUNTER — HOSPITAL ENCOUNTER (OUTPATIENT)
Dept: ULTRASOUND IMAGING | Facility: HOSPITAL | Age: 43
Discharge: HOME | End: 2023-10-10
Attending: FAMILY MEDICINE | Admitting: FAMILY MEDICINE
Payer: COMMERCIAL

## 2023-10-10 DIAGNOSIS — N97.9 FEMALE INFERTILITY: ICD-10-CM

## 2023-10-10 PROCEDURE — 76856 US EXAM PELVIC COMPLETE: CPT

## 2023-10-11 ENCOUNTER — TELEPHONE (OUTPATIENT)
Dept: FAMILY MEDICINE | Facility: CLINIC | Age: 43
End: 2023-10-11
Payer: COMMERCIAL

## 2023-10-11 NOTE — TELEPHONE ENCOUNTER
----- Message from Muna Moralez MD sent at 10/11/2023 12:49 PM CDT -----  Team - please call patient with results.  Ultrasound was reassuring. There was a possible polyp (benign growth) in her cervical canal.  I would like her to see OB-GYN as we had previously discussed about her infertility due to her age. Referral was already placed

## 2023-10-11 NOTE — TELEPHONE ENCOUNTER
Called patient with assistance of an , Valdo to deliver provider recommendation below.  If not heard from referral by Friday this week, to call back to clinic.  Patient verbalized understood.    MONA BolivarN, RN  [facility]      Muna Moralez MD  [facility]  Team - please call patient with results.  Ultrasound was reassuring. There was a possible polyp (benign growth) in her cervical canal.  I would like her to see OB-GYN as we had previously discussed about her infertility due to her age. Referral was already placed

## 2024-10-01 ENCOUNTER — PATIENT OUTREACH (OUTPATIENT)
Dept: CARE COORDINATION | Facility: CLINIC | Age: 44
End: 2024-10-01
Payer: COMMERCIAL

## 2024-10-01 NOTE — PROGRESS NOTES
Clinic Care Coordination Contact  Program:   [facility] [place]    Renewal:UCARE   Date Applied:      ALFREDO Outreach:   10/1/24:  1st outreach attempt. Left a message on voicemail with call back information and requested return call.  Plan: CTA will call again within 2 weeks.  Carolina Zhao  Care   [facility]  Clinic Care Coordination  [phone]      Health Insurance:        Referral/Screening: